# Patient Record
Sex: MALE | Race: ASIAN
[De-identification: names, ages, dates, MRNs, and addresses within clinical notes are randomized per-mention and may not be internally consistent; named-entity substitution may affect disease eponyms.]

---

## 2018-07-13 ENCOUNTER — HOSPITAL ENCOUNTER (EMERGENCY)
Dept: HOSPITAL 80 - FED | Age: 23
LOS: 1 days | Discharge: HOME | End: 2018-07-14
Payer: COMMERCIAL

## 2018-07-13 DIAGNOSIS — T40.8X1A: Primary | ICD-10-CM

## 2018-07-13 DIAGNOSIS — E86.9: ICD-10-CM

## 2018-07-13 LAB — PLATELET # BLD: 219 10^3/UL (ref 150–400)

## 2018-07-13 NOTE — CPEKG
Heart Rate: 133

RR Interval: 451

P-R Interval: 144

QRSD Interval: 96

QT Interval: 308

QTC Interval: 459

P Axis: 58

QRS Axis: 83

T Wave Axis: 26

EKG Severity - ABNORMAL ECG -

EKG Impression: SINUS TACHYCARDIA

EKG Impression: NONSPECIFIC T ABNORMALITIES, ANT-LAT LEADS

Electronically Signed By: Adrian De Anda 13-Jul-2018 22:16:24
Awake/Alert

## 2018-07-13 NOTE — EDPHY
H & P


Smoking Status: Never smoked


Time Seen by Provider: 07/13/18 21:16


HPI/ROS: 





Chief complaint.  Syncope, LSD ingestion





HPI.  23-year-old male [place] [employer] [place] and then 

apparently had a [place]opal episode [place] the [place].  Patient is baseline healthy.

  He received Versed IM and then IV as he was combative for EMS.  Patient has 

really no complaints.  Denies chest discomfort or trouble breathing.  No 

abdominal pain or vomiting diarrhea.  Unknown other ingestions





ROS


Constitutional.  no fever/chills, no weakness


Eyes.  no problems with vision


ENT.  no sore throat, no nasal drainage


Cardiovascular.  no chest pain


Respiratory.  no shortness of breath, no cough


Abdominal.  no abdominal pain, no nausea/vomiting, no diarrhea


.  no problems urinating


MS.  no calf pain/swelling, no neck/back pain, no joint pain


Skin.  no rash


Lymph.  no swollen glands


Neuro.  Syncope (Adrian De Anda)


Past Medical/Surgical History: 





Healthy (Adrian De Anda)


Social History: 





Single nonsmoker, no evidence of alcohol.  Positive LSD ingestion by history (

Adrian De Anda)


Physical Exam: 





General Appearance:  Alert well-developed male vital signs significant for 

heart rate 141


Eyes: Pupils equal and round no pallor or injection.


ENT, Mouth:  Mucous membranes are moist.


Respiratory:  There are no retractions, lungs are clear to auscultation.


Cardiovascular:  Regular rate and rhythm with tachycardia


Gastrointestinal:   Abdomen is soft and nontender, no masses, bowel sounds 

normal.


Neurological:  Awake and alert, sensory and motor exams grossly normal.


Skin: Warm and dry, no rashes.


Musculoskeletal:  Neck is supple nontender.


Extremities  symmetrical, full range of motion.


Psychiatric:  Oriented times person (Adrian De Anda)


Constitutional: 





 Initial Vital Signs











Temperature (C)  37.2 C   07/13/18 21:18


 


Heart Rate  140 H  07/13/18 21:18


 


Respiratory Rate  18   07/13/18 21:18


 


Blood Pressure  127/64 H  07/13/18 21:18


 


O2 Sat (%)  95   07/13/18 21:18








 











O2 Delivery Mode               Room Air














Allergies/Adverse Reactions: 


 





No Known Allergies Allergy (Verified 07/13/18 21:19)


 








Home Medications: 














 Medication  Instructions  Recorded


 


NK [No Known Home Meds]  07/13/18














Medical Decision Making





- Diagnostics


EKG Interpretation: 





EKG interpreted by me shows sinus tachycardia with normal interval and axis.  

QRS is normal there is no significant ST elevation or depression.  No 

arrhythmia.  The rate is 133 (Adrian De Anda)


Procedures: 





IV normal saline, monitor





Ativan IV (Adrian De Anda)


ED Course/Re-evaluation: 





Patient continues to be somewhat agitated.  Re-evaluation at 10:00 p.m..  

Ativan IV. 





10:25 p.m. patient remains agitated and has his pulled out his IV.  He is given 

Haldol and Benadryl IM 





Re-evaluation 11:10 p.m. Patient is resting comfortably and no longer agitated (

Adrian De Anda)





6:00 a.m.-


Patient has slept for most of my shift.  He is now awake and alert.  He feels 

well without any complaint.  He has been able to walk throughout the emergency 

department without difficulty.  His friend was not able to pick him up.  He is 

able to take a taxi home and will be discharged from the emergency department. (

Yareli Neal)


Differential Diagnosis: 





Patient admits to taking LSD.  He has been agitated.  There is no evidence for 

acute injury or illness.  Unclear whether the patient has co-ingested other 

intoxicants (Adrian De Anda)


Care Turn Over: 





Dr. Neal at 11:10 pm (Adrian De Anda)





- Data Points


Laboratory Results: 





 Laboratory Results





 07/13/18 21:50 





 07/13/18 21:50 





 











  07/13/18 07/13/18





  21:50 21:50


 


WBC    14.32 10^3/uL H 10^3/uL





    (3.80-9.50) 


 


RBC    4.36 10^6/uL L 10^6/uL





    (4.40-6.38) 


 


Hgb    14.0 g/dL g/dL





    (13.7-17.5) 


 


Hct    39.3 % L %





    (40.0-51.0) 


 


MCV    90.1 fL fL





    (81.5-99.8) 


 


MCH    32.1 pg pg





    (27.9-34.1) 


 


MCHC    35.6 g/dL g/dL





    (32.4-36.7) 


 


RDW    12.6 % %





    (11.5-15.2) 


 


Plt Count    219 10^3/uL 10^3/uL





    (150-400) 


 


MPV    9.3 fL fL





    (8.7-11.7) 


 


Neut % (Auto)    87.6 % H %





    (39.3-74.2) 


 


Lymph % (Auto)    4.6 % L %





    (15.0-45.0) 


 


Mono % (Auto)    7.0 % %





    (4.5-13.0) 


 


Eos % (Auto)    0.1 % L %





    (0.6-7.6) 


 


Baso % (Auto)    0.3 % %





    (0.3-1.7) 


 


Nucleat RBC Rel Count    0.0 % %





    (0.0-0.2) 


 


Absolute Neuts (auto)    12.55 10^3/uL H 10^3/uL





    (1.70-6.50) 


 


Absolute Lymphs (auto)    0.66 10^3/uL L 10^3/uL





    (1.00-3.00) 


 


Absolute Monos (auto)    1.00 10^3/uL H 10^3/uL





    (0.30-0.80) 


 


Absolute Eos (auto)    0.01 10^3/uL L 10^3/uL





    (0.03-0.40) 


 


Absolute Basos (auto)    0.04 10^3/uL 10^3/uL





    (0.02-0.10) 


 


Absolute Nucleated RBC    0.00 10^3/uL 10^3/uL





    (0-0.01) 


 


Immature Gran %    0.4 % %





    (0.0-1.1) 


 


Immature Gran #    0.06 10^3/uL 10^3/uL





    (0.00-0.10) 


 


Sodium  131 mEq/L L mEq/L  





   (135-145)  


 


Potassium  3.8 mEq/L mEq/L  





   (3.3-5.0)  


 


Chloride  100 mEq/L mEq/L  





   ()  


 


Carbon Dioxide  19 mEq/l L mEq/l  





   (22-31)  


 


Anion Gap  12 mEq/L mEq/L  





   (8-16)  


 


BUN  12 mg/dL mg/dL  





   (7-23)  


 


Creatinine  1.1 mg/dL mg/dL  





   (0.7-1.3)  


 


Estimated GFR  > 60   





   


 


Glucose  224 mg/dL H mg/dL  





   ()  


 


Calcium  8.7 mg/dL mg/dL  





   (8.5-10.4)  











Medications Given: 





 








Discontinued Medications





Diphenhydramine HCl (Benadryl Injection)  25 mg IM EDNOW ONE


   Stop: 07/13/18 22:26


   Last Admin: 07/13/18 22:31 Dose:  25 mg


Haloperidol Lactate (Haldol Injection)  10 mg IM EDNOW ONE


   Stop: 07/13/18 22:25


   Last Admin: 07/13/18 22:31 Dose:  10 mg


Sodium Chloride (Ns)  1,000 mls @ 0 mls/hr IV EDNOW ONE; Wide Open


   PRN Reason: Protocol


   Stop: 07/13/18 21:20


   Last Admin: 07/13/18 21:36 Dose:  1,000 mls


Sodium Chloride (Ns)  1,000 mls @ 0 mls/hr IV EDNOW ONE; Wide Open


   PRN Reason: Protocol


   Stop: 07/13/18 21:20


   Last Admin: 07/13/18 21:36 Dose:  1,000 mls


Lorazepam (Ativan Injection)  1 mg IVP EDNOW ONE


   Stop: 07/13/18 21:20


   Last Admin: 07/13/18 21:37 Dose:  1 mg


Lorazepam (Ativan Injection)  1 mg IVP EDNOW ONE


   Stop: 07/13/18 22:02


   Last Admin: 07/13/18 22:07 Dose:  1 mg








Departure





- Departure


Disposition: Home, Routine, Self-Care


Clinical Impression: 


 LSD ingestion





Condition: Fair


Instructions:  Polysubstance Abuse (ED)


Additional Instructions: 


Drink plenty of fluids and stay hydrated.  Ibuprofen 600 mg every 6 hr as 

needed for achiness or headache.





Return for worsening symptoms.





Recheck in 1 day if any continuing symptoms


Referrals: 


Patient,NotPresent [Unknown] - As per Instructions

## 2018-07-14 VITALS — DIASTOLIC BLOOD PRESSURE: 67 MMHG | SYSTOLIC BLOOD PRESSURE: 114 MMHG
